# Patient Record
Sex: MALE | Race: BLACK OR AFRICAN AMERICAN | Employment: UNEMPLOYED | URBAN - METROPOLITAN AREA
[De-identification: names, ages, dates, MRNs, and addresses within clinical notes are randomized per-mention and may not be internally consistent; named-entity substitution may affect disease eponyms.]

---

## 2021-03-27 ENCOUNTER — HOSPITAL ENCOUNTER (INPATIENT)
Age: 26
LOS: 1 days | Discharge: HOME | DRG: 683 | End: 2021-03-28
Attending: EMERGENCY MEDICINE | Admitting: INTERNAL MEDICINE

## 2021-03-27 DIAGNOSIS — E86.0 DEHYDRATION: ICD-10-CM

## 2021-03-27 DIAGNOSIS — E87.5 ACUTE HYPERKALEMIA: ICD-10-CM

## 2021-03-27 DIAGNOSIS — M62.82 NON-TRAUMATIC RHABDOMYOLYSIS: ICD-10-CM

## 2021-03-27 DIAGNOSIS — N17.0 ACUTE RENAL FAILURE WITH TUBULAR NECROSIS (HCC): Primary | ICD-10-CM

## 2021-03-27 PROBLEM — S93.409A ANKLE SPRAIN: Status: ACTIVE | Noted: 2021-03-27

## 2021-03-27 PROBLEM — N17.9 AKI (ACUTE KIDNEY INJURY) (HCC): Status: ACTIVE | Noted: 2021-03-27

## 2021-03-27 LAB
ALBUMIN SERPL-MCNC: 6.3 G/DL (ref 3.5–5)
ALBUMIN/GLOB SERPL: 1.5 {RATIO} (ref 1.1–2.2)
ALP SERPL-CCNC: 87 U/L (ref 45–117)
ALT SERPL-CCNC: 39 U/L (ref 12–78)
AMPHET UR QL SCN: NEGATIVE
ANION GAP SERPL CALC-SCNC: 11 MMOL/L (ref 5–15)
ANION GAP SERPL CALC-SCNC: 15 MMOL/L (ref 5–15)
ANION GAP SERPL CALC-SCNC: 17 MMOL/L (ref 5–15)
ANION GAP SERPL CALC-SCNC: 7 MMOL/L (ref 5–15)
APPEARANCE UR: ABNORMAL
AST SERPL-CCNC: 43 U/L (ref 15–37)
BACTERIA URNS QL MICRO: NEGATIVE /HPF
BARBITURATES UR QL SCN: NEGATIVE
BASOPHILS # BLD: 0 K/UL (ref 0–0.1)
BASOPHILS NFR BLD: 0 % (ref 0–1)
BENZODIAZ UR QL: NEGATIVE
BILIRUB SERPL-MCNC: 3.7 MG/DL (ref 0.2–1)
BILIRUB UR QL: NEGATIVE
BUN SERPL-MCNC: 20 MG/DL (ref 6–20)
BUN SERPL-MCNC: 28 MG/DL (ref 6–20)
BUN SERPL-MCNC: 29 MG/DL (ref 6–20)
BUN SERPL-MCNC: 29 MG/DL (ref 6–20)
BUN/CREAT SERPL: 10 (ref 12–20)
BUN/CREAT SERPL: 12 (ref 12–20)
BUN/CREAT SERPL: 15 (ref 12–20)
BUN/CREAT SERPL: 9 (ref 12–20)
CALCIUM SERPL-MCNC: 10.6 MG/DL (ref 8.5–10.1)
CALCIUM SERPL-MCNC: 8.8 MG/DL (ref 8.5–10.1)
CALCIUM SERPL-MCNC: 9.2 MG/DL (ref 8.5–10.1)
CALCIUM SERPL-MCNC: 9.3 MG/DL (ref 8.5–10.1)
CANNABINOIDS UR QL SCN: POSITIVE
CAOX CRY URNS QL MICRO: ABNORMAL
CHLORIDE SERPL-SCNC: 104 MMOL/L (ref 97–108)
CHLORIDE SERPL-SCNC: 104 MMOL/L (ref 97–108)
CHLORIDE SERPL-SCNC: 106 MMOL/L (ref 97–108)
CHLORIDE SERPL-SCNC: 99 MMOL/L (ref 97–108)
CK SERPL-CCNC: 1220 U/L (ref 39–308)
CK SERPL-CCNC: 837 U/L (ref 39–308)
CO2 SERPL-SCNC: 20 MMOL/L (ref 21–32)
CO2 SERPL-SCNC: 21 MMOL/L (ref 21–32)
CO2 SERPL-SCNC: 25 MMOL/L (ref 21–32)
CO2 SERPL-SCNC: 30 MMOL/L (ref 21–32)
COCAINE UR QL SCN: NEGATIVE
COLOR UR: ABNORMAL
CREAT SERPL-MCNC: 1.34 MG/DL (ref 0.7–1.3)
CREAT SERPL-MCNC: 2.26 MG/DL (ref 0.7–1.3)
CREAT SERPL-MCNC: 2.83 MG/DL (ref 0.7–1.3)
CREAT SERPL-MCNC: 3.25 MG/DL (ref 0.7–1.3)
CREAT UR-MCNC: 161 MG/DL
DIFFERENTIAL METHOD BLD: ABNORMAL
DRUG SCRN COMMENT,DRGCM: ABNORMAL
EOSINOPHIL # BLD: 0 K/UL (ref 0–0.4)
EOSINOPHIL NFR BLD: 0 % (ref 0–7)
EPITH CASTS URNS QL MICRO: ABNORMAL /LPF
ERYTHROCYTE [DISTWIDTH] IN BLOOD BY AUTOMATED COUNT: 13.5 % (ref 11.5–14.5)
ERYTHROCYTE [DISTWIDTH] IN BLOOD BY AUTOMATED COUNT: 14.3 % (ref 11.5–14.5)
GLOBULIN SER CALC-MCNC: 4.3 G/DL (ref 2–4)
GLUCOSE BLD STRIP.AUTO-MCNC: 146 MG/DL (ref 65–100)
GLUCOSE BLD STRIP.AUTO-MCNC: 159 MG/DL (ref 65–100)
GLUCOSE BLD STRIP.AUTO-MCNC: 301 MG/DL (ref 65–100)
GLUCOSE BLD STRIP.AUTO-MCNC: 90 MG/DL (ref 65–100)
GLUCOSE SERPL-MCNC: 84 MG/DL (ref 65–100)
GLUCOSE SERPL-MCNC: 85 MG/DL (ref 65–100)
GLUCOSE SERPL-MCNC: 96 MG/DL (ref 65–100)
GLUCOSE SERPL-MCNC: 97 MG/DL (ref 65–100)
GLUCOSE UR STRIP.AUTO-MCNC: 250 MG/DL
HCT VFR BLD AUTO: 35.3 % (ref 36.6–50.3)
HCT VFR BLD AUTO: 39 % (ref 36.6–50.3)
HCT VFR BLD AUTO: 50.5 % (ref 36.6–50.3)
HGB BLD-MCNC: 13.2 G/DL (ref 12.1–17)
HGB BLD-MCNC: 14.4 G/DL (ref 12.1–17)
HGB BLD-MCNC: 18.5 G/DL (ref 12.1–17)
HGB UR QL STRIP: NEGATIVE
IMM GRANULOCYTES # BLD AUTO: 0 K/UL
IMM GRANULOCYTES NFR BLD AUTO: 0 %
KETONES UR QL STRIP.AUTO: 40 MG/DL
LEUKOCYTE ESTERASE UR QL STRIP.AUTO: NEGATIVE
LYMPHOCYTES # BLD: 1 K/UL (ref 0.8–3.5)
LYMPHOCYTES NFR BLD: 4 % (ref 12–49)
MCH RBC QN AUTO: 28.8 PG (ref 26–34)
MCH RBC QN AUTO: 29 PG (ref 26–34)
MCHC RBC AUTO-ENTMCNC: 36.6 G/DL (ref 30–36.5)
MCHC RBC AUTO-ENTMCNC: 36.9 G/DL (ref 30–36.5)
MCV RBC AUTO: 78.5 FL (ref 80–99)
MCV RBC AUTO: 78.7 FL (ref 80–99)
METHADONE UR QL: NEGATIVE
MONOCYTES # BLD: 0.5 K/UL (ref 0–1)
MONOCYTES NFR BLD: 2 % (ref 5–13)
NEUTS BAND NFR BLD MANUAL: 5 % (ref 0–6)
NEUTS SEG # BLD: 22.5 K/UL (ref 1.8–8)
NEUTS SEG NFR BLD: 89 % (ref 32–75)
NITRITE UR QL STRIP.AUTO: NEGATIVE
NRBC # BLD: 0 K/UL (ref 0–0.01)
NRBC # BLD: 0 K/UL (ref 0–0.01)
NRBC BLD-RTO: 0 PER 100 WBC
NRBC BLD-RTO: 0 PER 100 WBC
OPIATES UR QL: NEGATIVE
PCP UR QL: NEGATIVE
PH UR STRIP: 5 [PH] (ref 5–8)
PHOSPHATE SERPL-MCNC: 3.4 MG/DL (ref 2.6–4.7)
PLATELET # BLD AUTO: 173 K/UL (ref 150–400)
PLATELET # BLD AUTO: 236 K/UL (ref 150–400)
PMV BLD AUTO: 12.3 FL (ref 8.9–12.9)
PMV BLD AUTO: 13 FL (ref 8.9–12.9)
POTASSIUM SERPL-SCNC: 3.5 MMOL/L (ref 3.5–5.1)
POTASSIUM SERPL-SCNC: 4.3 MMOL/L (ref 3.5–5.1)
POTASSIUM SERPL-SCNC: 7.5 MMOL/L (ref 3.5–5.1)
POTASSIUM SERPL-SCNC: 8.6 MMOL/L (ref 3.5–5.1)
PROT SERPL-MCNC: 10.6 G/DL (ref 6.4–8.2)
PROT UR STRIP-MCNC: NEGATIVE MG/DL
RBC # BLD AUTO: 4.97 M/UL (ref 4.1–5.7)
RBC # BLD AUTO: 6.42 M/UL (ref 4.1–5.7)
RBC #/AREA URNS HPF: ABNORMAL /HPF (ref 0–5)
RBC MORPH BLD: ABNORMAL
SERVICE CMNT-IMP: ABNORMAL
SERVICE CMNT-IMP: NORMAL
SODIUM SERPL-SCNC: 137 MMOL/L (ref 136–145)
SODIUM SERPL-SCNC: 139 MMOL/L (ref 136–145)
SODIUM SERPL-SCNC: 141 MMOL/L (ref 136–145)
SODIUM SERPL-SCNC: 142 MMOL/L (ref 136–145)
SODIUM UR-SCNC: 84 MMOL/L
SP GR UR REFRACTOMETRY: 1.01 (ref 1–1.03)
UA: UC IF INDICATED,UAUC: ABNORMAL
UROBILINOGEN UR QL STRIP.AUTO: 0.2 EU/DL (ref 0.2–1)
WBC # BLD AUTO: 15.9 K/UL (ref 4.1–11.1)
WBC # BLD AUTO: 24 K/UL (ref 4.1–11.1)
WBC URNS QL MICRO: ABNORMAL /HPF (ref 0–4)

## 2021-03-27 PROCEDURE — 84100 ASSAY OF PHOSPHORUS: CPT

## 2021-03-27 PROCEDURE — 85027 COMPLETE CBC AUTOMATED: CPT

## 2021-03-27 PROCEDURE — 96361 HYDRATE IV INFUSION ADD-ON: CPT

## 2021-03-27 PROCEDURE — 74011636637 HC RX REV CODE- 636/637: Performed by: EMERGENCY MEDICINE

## 2021-03-27 PROCEDURE — 80053 COMPREHEN METABOLIC PANEL: CPT

## 2021-03-27 PROCEDURE — 99285 EMERGENCY DEPT VISIT HI MDM: CPT

## 2021-03-27 PROCEDURE — 65270000032 HC RM SEMIPRIVATE

## 2021-03-27 PROCEDURE — 80048 BASIC METABOLIC PNL TOTAL CA: CPT

## 2021-03-27 PROCEDURE — 82550 ASSAY OF CK (CPK): CPT

## 2021-03-27 PROCEDURE — 96375 TX/PRO/DX INJ NEW DRUG ADDON: CPT

## 2021-03-27 PROCEDURE — 94640 AIRWAY INHALATION TREATMENT: CPT

## 2021-03-27 PROCEDURE — 81001 URINALYSIS AUTO W/SCOPE: CPT

## 2021-03-27 PROCEDURE — 96365 THER/PROPH/DIAG IV INF INIT: CPT

## 2021-03-27 PROCEDURE — 93005 ELECTROCARDIOGRAM TRACING: CPT

## 2021-03-27 PROCEDURE — 82962 GLUCOSE BLOOD TEST: CPT

## 2021-03-27 PROCEDURE — 96374 THER/PROPH/DIAG INJ IV PUSH: CPT

## 2021-03-27 PROCEDURE — 74011250637 HC RX REV CODE- 250/637: Performed by: INTERNAL MEDICINE

## 2021-03-27 PROCEDURE — 36415 COLL VENOUS BLD VENIPUNCTURE: CPT

## 2021-03-27 PROCEDURE — 74011250636 HC RX REV CODE- 250/636: Performed by: EMERGENCY MEDICINE

## 2021-03-27 PROCEDURE — 85025 COMPLETE CBC W/AUTO DIFF WBC: CPT

## 2021-03-27 PROCEDURE — 85018 HEMOGLOBIN: CPT

## 2021-03-27 PROCEDURE — 96366 THER/PROPH/DIAG IV INF ADDON: CPT

## 2021-03-27 PROCEDURE — 74011000250 HC RX REV CODE- 250: Performed by: EMERGENCY MEDICINE

## 2021-03-27 PROCEDURE — 74011250636 HC RX REV CODE- 250/636: Performed by: INTERNAL MEDICINE

## 2021-03-27 PROCEDURE — 84300 ASSAY OF URINE SODIUM: CPT

## 2021-03-27 PROCEDURE — 80307 DRUG TEST PRSMV CHEM ANLYZR: CPT

## 2021-03-27 PROCEDURE — 82570 ASSAY OF URINE CREATININE: CPT

## 2021-03-27 RX ORDER — OXYCODONE HYDROCHLORIDE 5 MG/1
5 TABLET ORAL
Status: DISCONTINUED | OUTPATIENT
Start: 2021-03-27 | End: 2021-03-28 | Stop reason: HOSPADM

## 2021-03-27 RX ORDER — SODIUM CHLORIDE 0.9 % (FLUSH) 0.9 %
5-40 SYRINGE (ML) INJECTION EVERY 8 HOURS
Status: DISCONTINUED | OUTPATIENT
Start: 2021-03-27 | End: 2021-03-27

## 2021-03-27 RX ORDER — ENOXAPARIN SODIUM 100 MG/ML
40 INJECTION SUBCUTANEOUS DAILY
Status: DISCONTINUED | OUTPATIENT
Start: 2021-03-27 | End: 2021-03-27

## 2021-03-27 RX ORDER — FAMOTIDINE 20 MG/1
20 TABLET, FILM COATED ORAL DAILY PRN
Status: DISCONTINUED | OUTPATIENT
Start: 2021-03-27 | End: 2021-03-27

## 2021-03-27 RX ORDER — POLYETHYLENE GLYCOL 3350 17 G/17G
17 POWDER, FOR SOLUTION ORAL DAILY PRN
Status: DISCONTINUED | OUTPATIENT
Start: 2021-03-27 | End: 2021-03-28 | Stop reason: HOSPADM

## 2021-03-27 RX ORDER — ONDANSETRON 2 MG/ML
4 INJECTION INTRAMUSCULAR; INTRAVENOUS
Status: COMPLETED | OUTPATIENT
Start: 2021-03-27 | End: 2021-03-27

## 2021-03-27 RX ORDER — PROMETHAZINE HYDROCHLORIDE 25 MG/1
12.5 TABLET ORAL
Status: DISCONTINUED | OUTPATIENT
Start: 2021-03-27 | End: 2021-03-28 | Stop reason: HOSPADM

## 2021-03-27 RX ORDER — ALBUTEROL SULFATE 2.5 MG/.5ML
10 SOLUTION RESPIRATORY (INHALATION)
Status: COMPLETED | OUTPATIENT
Start: 2021-03-27 | End: 2021-03-27

## 2021-03-27 RX ORDER — DEXTROSE 50 % IN WATER (D50W) INTRAVENOUS SYRINGE
Status: DISCONTINUED
Start: 2021-03-27 | End: 2021-03-27

## 2021-03-27 RX ORDER — CALCIUM GLUCONATE 94 MG/ML
2 INJECTION, SOLUTION INTRAVENOUS
Status: COMPLETED | OUTPATIENT
Start: 2021-03-27 | End: 2021-03-27

## 2021-03-27 RX ORDER — FAMOTIDINE 20 MG/1
20 TABLET, FILM COATED ORAL
Status: DISCONTINUED | OUTPATIENT
Start: 2021-03-27 | End: 2021-03-27

## 2021-03-27 RX ORDER — HEPARIN SODIUM 5000 [USP'U]/ML
5000 INJECTION, SOLUTION INTRAVENOUS; SUBCUTANEOUS EVERY 12 HOURS
Status: DISCONTINUED | OUTPATIENT
Start: 2021-03-27 | End: 2021-03-27

## 2021-03-27 RX ORDER — DEXTROSE 50 % IN WATER (D50W) INTRAVENOUS SYRINGE
50
Status: COMPLETED | OUTPATIENT
Start: 2021-03-27 | End: 2021-03-27

## 2021-03-27 RX ORDER — ACETAMINOPHEN 650 MG/1
650 SUPPOSITORY RECTAL
Status: DISCONTINUED | OUTPATIENT
Start: 2021-03-27 | End: 2021-03-28 | Stop reason: HOSPADM

## 2021-03-27 RX ORDER — SODIUM CHLORIDE 0.9 % (FLUSH) 0.9 %
5-40 SYRINGE (ML) INJECTION AS NEEDED
Status: DISCONTINUED | OUTPATIENT
Start: 2021-03-27 | End: 2021-03-28 | Stop reason: HOSPADM

## 2021-03-27 RX ORDER — SODIUM CHLORIDE 9 MG/ML
125 INJECTION, SOLUTION INTRAVENOUS CONTINUOUS
Status: DISCONTINUED | OUTPATIENT
Start: 2021-03-27 | End: 2021-03-28

## 2021-03-27 RX ORDER — ACETAMINOPHEN 325 MG/1
650 TABLET ORAL
Status: DISCONTINUED | OUTPATIENT
Start: 2021-03-27 | End: 2021-03-28 | Stop reason: HOSPADM

## 2021-03-27 RX ORDER — ONDANSETRON 2 MG/ML
4 INJECTION INTRAMUSCULAR; INTRAVENOUS
Status: DISCONTINUED | OUTPATIENT
Start: 2021-03-27 | End: 2021-03-28 | Stop reason: HOSPADM

## 2021-03-27 RX ORDER — FAMOTIDINE 20 MG/1
20 TABLET, FILM COATED ORAL DAILY PRN
Status: DISCONTINUED | OUTPATIENT
Start: 2021-03-27 | End: 2021-03-28 | Stop reason: HOSPADM

## 2021-03-27 RX ADMIN — Medication 10 ML: at 15:10

## 2021-03-27 RX ADMIN — OXYCODONE HYDROCHLORIDE 5 MG: 5 TABLET ORAL at 17:41

## 2021-03-27 RX ADMIN — OXYCODONE HYDROCHLORIDE 5 MG: 5 TABLET ORAL at 09:54

## 2021-03-27 RX ADMIN — SODIUM CHLORIDE, POTASSIUM CHLORIDE, SODIUM LACTATE AND CALCIUM CHLORIDE 1000 ML: 600; 310; 30; 20 INJECTION, SOLUTION INTRAVENOUS at 02:59

## 2021-03-27 RX ADMIN — CALCIUM GLUCONATE 2 G: 98 INJECTION, SOLUTION INTRAVENOUS at 02:50

## 2021-03-27 RX ADMIN — SODIUM CHLORIDE 1000 ML: 9 INJECTION, SOLUTION INTRAVENOUS at 01:45

## 2021-03-27 RX ADMIN — ALBUTEROL SULFATE 10 MG: 2.5 SOLUTION RESPIRATORY (INHALATION) at 04:44

## 2021-03-27 RX ADMIN — SODIUM CHLORIDE 125 ML/HR: 900 INJECTION, SOLUTION INTRAVENOUS at 23:00

## 2021-03-27 RX ADMIN — SODIUM CHLORIDE, POTASSIUM CHLORIDE, SODIUM LACTATE AND CALCIUM CHLORIDE 1000 ML: 600; 310; 30; 20 INJECTION, SOLUTION INTRAVENOUS at 04:35

## 2021-03-27 RX ADMIN — ONDANSETRON HYDROCHLORIDE 4 MG: 2 SOLUTION INTRAMUSCULAR; INTRAVENOUS at 01:47

## 2021-03-27 RX ADMIN — DEXTROSE MONOHYDRATE 25 G: 25 INJECTION, SOLUTION INTRAVENOUS at 03:02

## 2021-03-27 RX ADMIN — SODIUM BICARBONATE: 84 INJECTION, SOLUTION INTRAVENOUS at 03:10

## 2021-03-27 RX ADMIN — ALBUTEROL SULFATE 10 MG: 2.5 SOLUTION RESPIRATORY (INHALATION) at 02:58

## 2021-03-27 RX ADMIN — HUMAN INSULIN 10 UNITS: 100 INJECTION, SOLUTION SUBCUTANEOUS at 02:56

## 2021-03-27 RX ADMIN — DEXTROSE MONOHYDRATE 25 G: 25 INJECTION, SOLUTION INTRAVENOUS at 04:31

## 2021-03-27 RX ADMIN — HUMAN INSULIN 5 UNITS: 100 INJECTION, SOLUTION SUBCUTANEOUS at 04:32

## 2021-03-27 RX ADMIN — SODIUM CHLORIDE 1000 ML: 9 INJECTION, SOLUTION INTRAVENOUS at 02:43

## 2021-03-27 RX ADMIN — ACETAMINOPHEN 650 MG: 325 TABLET ORAL at 09:54

## 2021-03-27 RX ADMIN — SODIUM CHLORIDE 125 ML/HR: 900 INJECTION, SOLUTION INTRAVENOUS at 15:10

## 2021-03-27 RX ADMIN — ACETAMINOPHEN 650 MG: 325 TABLET ORAL at 17:41

## 2021-03-27 NOTE — PROGRESS NOTES
Hospitalist Progress Note    NAME: Rica Ryan. :  1995   MRN:  [id number]       Assessment / Plan:  Acute kidney injury likely multifactorial possible dehydration I do not think this is rhabdomyolysis with CPK of 800. We will continue current regimen IV fluids normal saline at 125 ml  per hour. Monitor patient closely. Patient renal function is trending down we will continue for another day. Anion gap metabolic acidosis likely secondary to renal failure patient was on bicarb which has been discontinued appeared to be resolved with normal anion gap. Closed. Severe hyper kalemia secondary to acute kidney injury improving and resolved. Continue IV fluids I will DC bicarb. Dehydration continue IV fluids normal saline. Polysubstance abuse with marijuana positive social work consulted counseling. Leukocytosis with WBC of 24,000 no evidence suggestive of infection likely reactive in a young man will monitor repeat WBC count.     / Body mass index is 20.98 kg/m². Code status: Full code  Prophylaxis: Lovenox  Recommended Disposition: Home possible tomorrow morning     Subjective:     Chief Complaint / Reason for Physician Visit  . Discussed with RN events overnight. Patient seen and examined at bedside comfortable no new complaints no chest pain no shortness of breath no fever no chills. Patient stated that he is doing much much better today. Review of Systems:  Symptom Y/N Comments  Symptom Y/N Comments   Fever/Chills x   Chest Pain     Poor Appetite x   Edema     Cough x   Abdominal Pain     Sputum x   Joint Pain     SOB/ROBLES x   Pruritis/Rash     Nausea/vomit x   Tolerating PT/OT     Diarrhea x   Tolerating Diet     Constipation    Other       Could NOT obtain due to:      Objective:     VITALS:   Last 24hrs VS reviewed since prior progress note.  Most recent are:  Patient Vitals for the past 24 hrs:   Temp Pulse Resp BP SpO2   21 0751 98 °F (36.7 °C) 76 11 133/64 100 % 03/27/21 0600 97.9 °F (36.6 °C) 72 10 (!) 128/58 100 %   03/27/21 0507 97.5 °F (36.4 °C) 83 21 (!) 116/57 100 %   03/27/21 0443     100 %   03/27/21 0330  87 11 128/62 100 %   03/27/21 0300  65 18 (!) 125/48 100 %   03/27/21 0257     100 %   03/27/21 0246  65 11 (!) 128/56 100 %   03/27/21 0200   14 117/60 100 %   03/27/21 0138 97.5 °F (36.4 °C) 64 22 96/70 100 %   03/27/21 0135  (!) 59 14 96/70        Intake/Output Summary (Last 24 hours) at 3/27/2021 1011  Last data filed at 3/27/2021 0600  Gross per 24 hour   Intake 4566.67 ml   Output 630 ml   Net 3936.67 ml        I had a face to face encounter and independently examined this patient on 3/27/2021, as outlined below:  PHYSICAL EXAM:  General: WD, WN. Alert, cooperative, no acute distress    EENT:  EOMI. Anicteric sclerae. MMM  Resp:  CTA bilaterally, no wheezing or rales. No accessory muscle use  CV:  Regular  rhythm,  No edema  GI:  Soft, Non distended, Non tender. +Bowel sounds  Neurologic:  Alert and oriented X 3, normal speech,   Psych:   Good insight. Not anxious nor agitated  Skin:  No rashes. No jaundice    Reviewed most current lab test results and cultures  YES  Reviewed most current radiology test results   YES  Review and summation of old records today    NO  Reviewed patient's current orders and MAR    YES  PMH/ reviewed - no change compared to H&P  ________________________________________________________________________  Care Plan discussed with:    Comments   Patient     Family      RN     Care Manager     Consultant                        Multidiciplinary team rounds were held today with , nursing, pharmacist and clinical coordinator. Patient's plan of care was discussed; medications were reviewed and discharge planning was addressed.      ________________________________________________________________________  Total NON critical care TIME    Total CRITICAL CARE TIME Spent:   Minutes non procedure based Comments   >50% of visit spent in counseling and coordination of care     ________________________________________________________________________  Susana Ragsdale MD     Procedures: see electronic medical records for all procedures/Xrays and details which were not copied into this note but were reviewed prior to creation of Plan. LABS:  I reviewed today's most current labs and imaging studies.   Pertinent labs include:  Recent Labs     03/27/21 0139   WBC 24.0*   HGB 18.5*   HCT 50.5*        Recent Labs     03/27/21  0430 03/27/21  0238 03/27/21 0139    139 137   K 4.3 7.5* 8.6*    104 99   CO2 25 20* 21   GLU 96 84 97   BUN 28* 29* 29*   CREA 2.26* 2.83* 3.25*   CA 9.3 8.8 10.6*   PHOS  --  3.4  --    ALB  --   --  6.3*   TBILI  --   --  3.7*   ALT  --   --  39       Signed: Susana Ragsdale MD

## 2021-03-27 NOTE — PROGRESS NOTES
TRANSFER - IN REPORT:    Verbal report received from Benedict RN(name) on [address]  being received from ED(unit) for routine progression of care      Report consisted of patients Situation, Background, Assessment and   Recommendations(SBAR). Information from the following report(s) SBAR, Kardex, ED Summary, Intake/Output, MAR, Recent Results, Med Rec Status and Cardiac Rhythm  was reviewed with the receiving nurse. Opportunity for questions and clarification was provided. Assessment completed upon patients arrival to unit and care assumed.

## 2021-03-27 NOTE — ED NOTES
TRANSFER - OUT REPORT:    Verbal report given to Arnaud Mendoza (name) on Adi Patterson. Anju Patches  being transferred to [address] [place] [address] ([facility]) for routine progression of care       Report consisted of patients Situation, Background, Assessment and   Recommendations(SBAR). Information from the following report(s) SBAR was reviewed with the receiving nurse. Lines:   Peripheral IV 03/27/21 Left Antecubital (Active)   Site Assessment Clean, dry, & intact 03/27/21 0144   Phlebitis Assessment 0 03/27/21 0144   Infiltration Assessment 0 03/27/21 0144   Dressing Status Clean, dry, & intact 03/27/21 0144   Dressing Type Transparent 03/27/21 0144   Hub Color/Line Status Blue 03/27/21 0144   Action Taken Blood drawn 03/27/21 0144       Peripheral IV 03/27/21 Right Arm (Active)   Site Assessment Clean, dry, & intact 03/27/21 0241   Phlebitis Assessment 0 03/27/21 0241   Infiltration Assessment 0 03/27/21 0241   Dressing Status Clean, dry, & intact 03/27/21 0241   Dressing Type Transparent 03/27/21 0241   Hub Color/Line Status Pink 03/27/21 0241   Action Taken Blood drawn 03/27/21 0241        Opportunity for questions and clarification was provided.       Patient transported with:   Monitor  Registered Nurse

## 2021-03-27 NOTE — ED TRIAGE NOTES
18yo M reports to this ED via EMS with c/o excessive thirst, muscle cramps, nausea and one episode of vomiting around 1830 yesterday evening. Pt states he played basketball earlier in the day for about 3hrs without hydrating. When pt returned home he did not feel well so he ate a banana and drank some flavored water but vomited it back up. Since then pt states he has been dry heaving. Reports he has not urinated since 1830. Mucous membranes and skin appear dry on assessment. Pt is alert, oriented, and appropriate at this time. Emergency Department Nursing Plan of Care       The Nursing Plan of Care is developed from the Nursing assessment and Emergency Department Attending provider initial evaluation. The plan of care may be reviewed in the ED Provider note.     The Plan of Care was developed with the following considerations:   Patient / Family readiness to learn indicated by:verbalized understanding  Persons(s) to be included in education: patient  Barriers to Learning/Limitations:No    Signed     Amisha Jimenez RN    3/27/2021   1:58 AM

## 2021-03-27 NOTE — ED NOTES
Spoke to Bettye from [address] [place] [address] about bicarb drip and it not scanning on the [facility]. Adi unable to diagnose why it was not scanning but verbally confirmed over the phone what we had vs what was ordered. Reports everything was correct and reports to bypass alerts.  Primary nurse aware and at bedside to assist.

## 2021-03-27 NOTE — PROGRESS NOTES
[facility] Medication Reconciliation     Information obtained from:Patient  RxQuery data available1:None    Comments/recommendations:    1)Medication reconciliation performed with patient via telephone. Patient denies taking any prescribed or OTC medications. Patient denies any allergies to any medications. 2) Medication changes (since last review): Added  None  Removed  Ibuprofen Suspension  Adjusted  None    3) The [place] Prescription Monitoring Program () was accessed to determine fill history of any controlled medications. No record found. 1RxQuery pharmacy benefit data reflects medications filled and processed through the patient's insurance, however                this data does NOT capture whether the medication was picked up or is currently being taken by the patient.      Thank you,     Etta Garcia, PharmD   Contact: [phone]

## 2021-03-27 NOTE — PROGRESS NOTES
1619  BEDSIDE_VERBAL_ shift change report given to Ramya Rushing (oncoming nurse) by Bennie Jaffe RN (offgoing nurse). Report included the following information; review of SBAR, vs, meds, orders, ROS, POC. All questions answered, transfer of care complete. 1213  Am shift assessment and vs completed. 2280  Hospitalist in at pt bedside for rounding, verbal order to stop IVF, pt likely to stay overnight to review labs, likely discharge in am.  Pt aware and states understanding. 0900  Pt refused heparin injection. 1000  Pt given prn tylenol and roxicodone by МАРИНА Calhoun, RN (nursing supervisor) to treat pain of 4/10. This RN to f/up. 200  Family member at bedside    1210  Noon VS and reassessment completed. No significant changes. RAC IV access removed d/t discomfort per pt request.    1330  Safety rounds completed, pt sleeping, call bell and phone in reach. 1200 Samia Mega Ibarra order for IVF NS at 125ml/hr completed. 1655  Afternoon VS and reassessment completed, no changes to assessment. 1725  Pt level of care down graded to medical, tele monitor removed, pt transferred to room 225.    1750  New IV access started to LEE ANN #22g and serum labs collected at time of IV intiation by МАРИНА Calhoun, RADHA (nursing supervisor). 1800  Safety rounds completed, fall precautions in place, pt in bedside chair watching tv and talking on phone. 1945  Received call from lab requesting a stat repeat H&H for comparison d/t drop in pts hgb from 18.5 collected at 0139 to 14 range collected at 1747. Lab tach informed that pt has been aggressively hydrated during the time frame specifcally r/t admit dx of rhabdo and dehydration. Night shift nurse sitting next to this RN at time of call and notified immediately. 1950  Shift change report given to Bennie Jaffe RN by this RN. Report included review of SBAR, vs, labs, meds, orders, ROS and POC. All questions answered, transfer of care complete.

## 2021-03-27 NOTE — PROGRESS NOTES
[facility] Dosing Services    Physician: Dr. Abby Kimbrough    The following medication: Famotidine was automatically dose-adjusted P&T Committee Protocol, with respect to renal function. Consult provided for this   26 y.o. , male , for the indication of GERD    Pt Weight:   Wt Readings from Last 1 Encounters:   03/27/21 59 kg (130 lb)       Previous Regimen Famotidine 20mg bid   Serum Creatinine Lab Results   Component Value Date/Time    Creatinine 2.26 (H) 03/27/2021 04:30 AM    Creatinine (POC) 1.0 09/21/2013 11:29 PM       Creatinine Clearance Estimated Creatinine Clearance: 42.1 mL/min (A) (based on SCr of 2.26 mg/dL (H)). BUN Lab Results   Component Value Date/Time    BUN 28 (H) 03/27/2021 04:30 AM    BUN (POC) 12 09/21/2013 11:29 PM       Dosage changed to:  Famotidine 20mg every 24 hours as needed      Pharmacy to continue to monitor patient daily. Will make dosage adjustments based upon changing renal function.   Thank you,     Meryl Butler, PharmD   Contact: [phone]

## 2021-03-27 NOTE — PROGRESS NOTES
0600 pt arrived to floor via stretcher and IV pump infusing bicarb at 200cc/hr. Pt alert and oriented. Orders reviewed. IV fluids decreased to 125 cc/hr. ADmission assessment completed and care plan established. Pt oriented to room. Ambulated to bathroom with steady gait. Skin intact with no signs of breakdown. Pt alert and oriented. Pleasant and cooperative. 8805  Report given to [facility] RN using SBAR, [facility] and resutls review.

## 2021-03-27 NOTE — PROGRESS NOTES
Reason for Admission:  Per H/P- Cesilia Quintero. Mavis Melara is a 26 y.o.  male who presents with above. Pt says that he was in his usual state of health until yesterday. Due to the good weather, Pt decided to go play basketball x 4 hours yesterday at [facility], didn't drink any water while he was playing. When came back into the house ~5:30pm, he started to feel poorly. +diffuse severe cramping in legs and arms, back, feet. +recurrent nausea, tried to drink water but was continously vomiting. Denies dizziness. No recent illnesses. No longer nauseated, but was dry heaving at home prior to admission. Patient is here under INPATIENT status. Has   BLUE CROSS/[facility] CROSS [facility] PPO. No notifications needed at this time. RUR Score:    7%                 Plan for utilizing home health:      No plans for [facility] at this time. PCP: First and Last name:  Mango Roberts MD     Name of Practice:    Are you a current patient: Yes/No:    Approximate date of last visit:    Can you participate in a virtual visit with your PCP:                     Current Advanced Directive/Advance Care Plan: Full Code      Healthcare Decision Maker:   Click here to complete [address] including selection of the Healthcare Decision Maker Relationship (ie \"Primary\")                             Transition of Care Plan:         Case opened for assessment and discharge planning.     DANA Dooley/NANETTE  [phone]

## 2021-03-27 NOTE — ED PROVIDER NOTES
EMERGENCY DEPARTMENT HISTORY AND PHYSICAL EXAM      Date: 3/27/2021  Patient Name: Donavon Muller    Please note that this dictation was completed with [employer], the computer voice recognition software. Quite often unanticipated grammatical, syntax, homophones, and other interpretive errors are inadvertently transcribed by the computer software. Please disregard these errors. Please excuse any errors that have escaped final proofreading. History of Presenting Illness     Chief Complaint   Patient presents with    Dehydration       History Provided By: Patient     HPI: Donavon Muller, 26 y.o. male, presenting the emergency department complaining of dehydration, patient states he was playing basketball for approximately 4 hours today and did not drink any water. He states that during basketball play he eventually stopped sweating, he became very fatigued and tired, started having muscle cramps. Tried to drink water at the end and then began vomiting. Reports diffuse muscle cramps, myalgia. Denies any fevers or chills. Denies any chest pain or shortness of breath. No other exacerbating or relieving factors or associated symptoms. PCP: David Cee MD    No current facility-administered medications on file prior to encounter. Current Outpatient Medications on File Prior to Encounter   Medication Sig Dispense Refill    ibuprofen (ADVIL;MOTRIN) 100 mg/5 mL suspension Take 30 mL by mouth three (3) times daily as needed (pain). 1 Bottle 0       Past History     Past Medical History:  History reviewed. No pertinent past medical history. Past Surgical History:  History reviewed. No pertinent surgical history. Family History:  History reviewed. No pertinent family history.     Social History:  Social History     Tobacco Use    Smoking status: Current Every Day Smoker     Packs/day: 0.50    Smokeless tobacco: Never Used   Substance Use Topics    Alcohol use: Yes     Comment: occassionally    Drug use: No       Allergies:  No Known Allergies      Review of Systems   Review of Systems   Constitutional: Negative for chills and fever. HENT: Negative for congestion and sore throat. Eyes: Negative for visual disturbance. Respiratory: Negative for cough and shortness of breath. Cardiovascular: Negative for chest pain and leg swelling. Gastrointestinal: Positive for nausea and vomiting. Negative for abdominal pain, blood in stool and diarrhea. Endocrine: Negative for polyuria. Genitourinary: Negative for dysuria and testicular pain. Musculoskeletal: Positive for myalgias. Negative for arthralgias and joint swelling. Skin: Negative for rash. Allergic/Immunologic: Negative for immunocompromised state. Neurological: Negative for weakness and headaches. Hematological: Does not bruise/bleed easily. Psychiatric/Behavioral: Negative for confusion. Physical Exam   Physical Exam  Vitals signs and nursing note reviewed. Constitutional:       Appearance: He is well-developed. HENT:      Head: Normocephalic and atraumatic. Mouth/Throat:      Mouth: Mucous membranes are dry. Eyes:      General:         Right eye: No discharge. Left eye: No discharge. Conjunctiva/sclera: Conjunctivae normal.      Pupils: Pupils are equal, round, and reactive to light. Neck:      Musculoskeletal: Normal range of motion and neck supple. Trachea: No tracheal deviation. Cardiovascular:      Rate and Rhythm: Normal rate and regular rhythm. Heart sounds: Normal heart sounds. No murmur. Pulmonary:      Effort: Pulmonary effort is normal. No respiratory distress. Breath sounds: Normal breath sounds. No wheezing or rales. Abdominal:      General: Bowel sounds are normal.      Palpations: Abdomen is soft. Tenderness: There is no abdominal tenderness. There is no guarding or rebound. Musculoskeletal: Normal range of motion.          General: No tenderness or deformity. Skin:     General: Skin is warm and dry. Findings: No erythema or rash. Neurological:      Mental Status: He is alert and oriented to person, place, and time. Psychiatric:         Behavior: Behavior normal.         Diagnostic Study Results     Labs -     Recent Results (from the past 12 hour(s))   CBC WITH AUTOMATED DIFF    Collection Time: 03/27/21  1:39 AM   Result Value Ref Range    WBC 24.0 (HH) 4.1 - 11.1 K/uL    RBC 6.42 (H) 4.10 - 5.70 M/uL    HGB 18.5 (H) 12.1 - 17.0 g/dL    HCT 50.5 (H) 36.6 - 50.3 %    MCV 78.7 (L) 80.0 - 99.0 FL    MCH 28.8 26.0 - 34.0 PG    MCHC 36.6 (H) 30.0 - 36.5 g/dL    RDW 14.3 11.5 - 14.5 %    PLATELET 788 841 - 316 K/uL    MPV 12.3 8.9 - 12.9 FL    NRBC 0.0 0  WBC    ABSOLUTE NRBC 0.00 0.00 - 0.01 K/uL    NEUTROPHILS 89 (H) 32 - 75 %    BAND NEUTROPHILS 5 0 - 6 %    LYMPHOCYTES 4 (L) 12 - 49 %    MONOCYTES 2 (L) 5 - 13 %    EOSINOPHILS 0 0 - 7 %    BASOPHILS 0 0 - 1 %    IMMATURE GRANULOCYTES 0 %    ABS. NEUTROPHILS 22.5 (H) 1.8 - 8.0 K/UL    ABS. LYMPHOCYTES 1.0 0.8 - 3.5 K/UL    ABS. MONOCYTES 0.5 0.0 - 1.0 K/UL    ABS. EOSINOPHILS 0.0 0.0 - 0.4 K/UL    ABS. BASOPHILS 0.0 0.0 - 0.1 K/UL    ABS. IMM. GRANS. 0.0 K/UL    DF MANUAL      RBC COMMENTS STOMATOCYTES  1+       METABOLIC PANEL, COMPREHENSIVE    Collection Time: 03/27/21  1:39 AM   Result Value Ref Range    Sodium 137 136 - 145 mmol/L    Potassium 8.6 (HH) 3.5 - 5.1 mmol/L    Chloride 99 97 - 108 mmol/L    CO2 21 21 - 32 mmol/L    Anion gap 17 (H) 5 - 15 mmol/L    Glucose 97 65 - 100 mg/dL    BUN 29 (H) 6 - 20 MG/DL    Creatinine 3.25 (H) 0.70 - 1.30 MG/DL    BUN/Creatinine ratio 9 (L) 12 - 20      GFR est AA 29 (L) >60 ml/min/1.73m2    GFR est non-AA 24 (L) >60 ml/min/1.73m2    Calcium 10.6 (H) 8.5 - 10.1 MG/DL    Bilirubin, total 3.7 (H) 0.2 - 1.0 MG/DL    ALT (SGPT) 39 12 - 78 U/L    AST (SGOT) 43 (H) 15 - 37 U/L    Alk.  phosphatase 87 45 - 117 U/L    Protein, total 10.6 (H) 6.4 - 8.2 g/dL    Albumin 6.3 (H) 3.5 - 5.0 g/dL    Globulin 4.3 (H) 2.0 - 4.0 g/dL    A-G Ratio 1.5 1.1 - 2.2     CK    Collection Time: 03/27/21  1:39 AM   Result Value Ref Range     (H) 39 - 308 U/L   EKG, 12 LEAD, INITIAL    Collection Time: 03/27/21  2:28 AM   Result Value Ref Range    Ventricular Rate 58 BPM    Atrial Rate 58 BPM    P-R Interval 152 ms    QRS Duration 80 ms    Q-T Interval 424 ms    QTC Calculation (Bezet) 416 ms    Calculated P Axis 82 degrees    Calculated R Axis 79 degrees    Calculated T Axis 75 degrees    Diagnosis       Sinus bradycardia with sinus arrhythmia  Otherwise normal ECG  When compared with ECG of 21-SEP-2013 23:20,  ST elevation now present in Anterior leads  Nonspecific T wave abnormality no longer evident in Inferior leads  Nonspecific T wave abnormality no longer evident in Anterior leads     METABOLIC PANEL, BASIC    Collection Time: 03/27/21  2:38 AM   Result Value Ref Range    Sodium 139 136 - 145 mmol/L    Potassium 7.5 (HH) 3.5 - 5.1 mmol/L    Chloride 104 97 - 108 mmol/L    CO2 20 (L) 21 - 32 mmol/L    Anion gap 15 5 - 15 mmol/L    Glucose 84 65 - 100 mg/dL    BUN 29 (H) 6 - 20 MG/DL    Creatinine 2.83 (H) 0.70 - 1.30 MG/DL    BUN/Creatinine ratio 10 (L) 12 - 20      GFR est AA 34 (L) >60 ml/min/1.73m2    GFR est non-AA 28 (L) >60 ml/min/1.73m2    Calcium 8.8 8.5 - 10.1 MG/DL   PHOSPHORUS    Collection Time: 03/27/21  2:38 AM   Result Value Ref Range    Phosphorus 3.4 2.6 - 4.7 MG/DL       Radiologic Studies -   No orders to display     CT Results  (Last 48 hours)    None        CXR Results  (Last 48 hours)    None            Medical Decision Making   I am the first provider for this patient. I reviewed the vital signs, available nursing notes, past medical history, past surgical history, family history and social history. Vital Signs-Reviewed the patient's vital signs.   Patient Vitals for the past 12 hrs:   Temp Pulse Resp BP SpO2   03/27/21 0257     100 %   03/27/21 0138 97.5 °F (36.4 °C) 64 22 96/70 100 %       EKG interpretation:  Sinus bradycardic rhythm. Rate 58. Normal axis. Normal intervals. There is peak T waves in the precordium concerning for hyperkalemia. Interpreted by me    Records Reviewed:   Nursing notes, Prior visits     Provider Notes (Medical Decision Making):   Patient presenting with concern for dehydration, possible rhabdo, will check labs including CK, will provide IV fluids for dehydration. Patient looks nontoxic, but mildly ill. Disposition pending laboratory work-up, symptomatic improvement. ED Course:   Initial assessment performed. The patients presenting problems have been discussed, and they are in agreement with the care plan formulated and outlined with them. I have encouraged them to ask questions as they arise throughout their visit. ED Course as of Mar 27 0517   Sat Mar 27, 2021   0320 [facility] hospitalist has been paged. Awaiting return call    [AR]   6370 Discussed with hospitalist at [place]. They feel the patient needs to go through the ED and may need urgent dialysis. I agree with the patient needing possible urgent dialysis. He is hemodynamically stable at this point though, mentating normally. I am rechecking a Chem-7 at this point and repeating some albuterol insulin and dextrose. He is still on a bicarb drip. He is making some urine, but not 200 mL of urine. The transfer center was unable to reach an ED physician as they were in the trauma bay at this point. They will call back when a physician is available    [AR]   [phone] ED will conditionally accept, but want me to talk to our hospitalist first to see if they will keep the patient here before accepting the patient, as they do not believe he needs dialysis emergently. I relayed that they would likely not keep the patient here given his acuity and risk of decompensation, and the patient would be best served by a higher level of care.  Will consult our tele-hospitalist to see if they will accept. Discussed with Dr. Schwab, ed attending    [AR]   0454 Potassium now 4.3. Could possibly be admitted here given rapid improvement.     [AR]   0501 Discussed with Emily Calderon MD. Will admit here.     [AR]      ED Course User Index  [AR] Abhijit Gr,        Lab interpretation/plan:  Patient found to have significant hyperkalemia, EKG does look concerning for hyperkalemia with peaked T waves in the precordium.  Patient is already had a liter of normal saline.  Will order another liter of normal saline, place another IV, give IV calcium, IV insulin, IV dextrose, bicarb drip, high-dose albuterol.  Patient will need transfer to higher level of care.  Patient and family would prefer to go to [facility]    3:11 AM  Repeat potassium still elevated at 7.5.  Improving      Critical Care Time:   I have spent 96 minutes of critical care time in evaluating and treating this patient.  This includes time spent at bedside, time with family and decision makers, documentation, review of labs and imaging, and/or consultation with specialists.  It does not include time spent on separately billed procedures.     This patient presents with a critical illness or injury that acutely impairs one or more vital organ systems such that there is a high probability of imminent or life threatening deterioration in the patient's condition.  This case involved decision making of high complexity to assess, manipulate, and support vital organ system failure and/or to prevent further life threatening deterioration of the patient's condition. Failure to initiate these interventions on an urgent basis would likely result in sudden, clinically significant or life threatening deterioration in the patient's condition.    Abnormal findings supporting critical care: Hyperkalemia, acute renal failure, dehydration  Interventions to support critical care:  IV fluids times multiple boluses, IV insulin, IV dextrose, IV bicarb  drip, lab reassessment, lab interpretation, frequent reassessment, EKG interpretation, cardiac monitoring  Failure to intervene may result in: Arrhythmia, renal failure, need for dialysis, death    Disposition:  Patient is being admitted to the hospital by Dr. Ivan Faye. The results of their tests and reasons for their admission have been discussed with them and/or available family. They convey agreement and understanding for the need to be admitted and for their admission diagnosis. Consultation has been made with the inpatient physician specialist for hospitalization. PLAN:  1. Current Discharge Medication List        2. Follow-up Information    None         Return to ED if worse     Diagnosis     Clinical Impression:   1. Acute renal failure with tubular necrosis (HCC)    2. Acute hyperkalemia    3. Dehydration    4. Non-traumatic rhabdomyolysis        Attestations:   This note was completed by Brad Woo DO

## 2021-03-27 NOTE — PROGRESS NOTES
Care plan reviewed, pt progressing appropriately    Problem: Falls - Risk of  Goal: *Absence of Falls  Description: Document Rama Hines Fall Risk and appropriate interventions in the flowsheet.   Outcome: Progressing Towards Goal  Note: Fall Risk Interventions:   Bed low and locked  Call bell and phone in reach  Nonskid footwear  Side rails up x2         Medication Interventions: Evaluate medications/consider consulting pharmacy, Teach patient to arise slowly                   Problem: Patient Education: Go to Patient Education Activity  Goal: Patient/Family Education  Outcome: Progressing Towards Goal     Problem: Pain  Goal: *Control of Pain  Outcome: Progressing Towards Goal  Goal: *PALLIATIVE CARE:  Alleviation of Pain  Outcome: Progressing Towards Goal     Problem: Patient Education: Go to Patient Education Activity  Goal: Patient/Family Education  Outcome: Progressing Towards Goal

## 2021-03-27 NOTE — H&P
Hospitalist Admission Note    NAME: Amber Roman   :  1995   MRN:  [id number]     Date/Time:  3/27/2021 5:04 AM    Patient PCP: Librado Mcmahan MD  ______________________________________________________________________  Given the patient's current clinical presentation, I have a high level of concern for decompensation if discharged from the emergency department. Complex decision making was performed, which includes reviewing the patient's available past medical records, laboratory results, and x-ray films. My assessment of this patient's clinical condition and my plan of care is as follows. 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 Will need a work note if still hospitalized on . Subjective:   CHIEF COMPLAINT:  Nausea, cramping    HISTORY OF PRESENT ILLNESS:     Amberorion Villafuerteofe Roman is a 26 y.o.  male who presents with above. Pt says that he was in his usual state of health until yesterday. Due to the good weather, Pt decided to go play basketball x 4 hours yesterday at [facility], didn't drink any water while he was playing. When came back into the house ~5:30pm, he started to feel poorly. +diffuse severe cramping in legs and arms, back, feet. +recurrent nausea, tried to drink water but was continously vomiting. Denies dizziness. No recent illnesses. No longer nauseated, but was dry heaving at home prior to admission. We were asked to admit for work up and evaluation of the above problems. History reviewed. Remote ankle sprain, no chronic medical illnesses. History reviewed. No pertinent surgical history. Social History     Tobacco Use    Smoking status: Current Every Day Smoker     Packs/day: 0.50    Smokeless tobacco: Never Used   Substance Use Topics    Alcohol use: Yes     Comment: occassionally        Family History   Problem Relation Age of Onset    Diabetes Father        No Known Allergies     Prior to Admission medications    Medication Sig Start Date End Date Taking? Authorizing Provider   ibuprofen (ADVIL;MOTRIN) 100 mg/5 mL suspension Take 30 mL by mouth three (3) times daily as needed (pain). 5/6/15   Margueritte Schilder, PA       REVIEW OF SYSTEMS:     I am not able to complete the review of systems because:    The patient is intubated and sedated    The patient has altered mental status due to his acute medical problems    The patient has baseline aphasia from prior stroke(s)    The patient has baseline dementia and is not reliable historian    The patient is in acute medical distress and unable to provide information           Total of 12 systems reviewed as follows:       POSITIVE= underlined text  Negative = text not underlined  General:  fever, chills, sweats, generalized weakness, weight loss/gain,      loss of appetite   Eyes:    blurred vision, eye pain, loss of vision, double vision  ENT:    rhinorrhea, pharyngitis   Respiratory:   cough, sputum production, SOB, ROBLES, wheezing, pleuritic pain   Cardiology:   chest pain, palpitations, orthopnea, PND, edema, syncope   Gastrointestinal:  abdominal pain , N/V, diarrhea, dysphagia, constipation, bleeding   Genitourinary:  frequency, urgency, dysuria, hematuria, incontinence   Muskuloskeletal :  arthralgia, myalgia, back pain  Hematology:  easy bruising, nose or gum bleeding, lymphadenopathy   Dermatological: rash, ulceration, pruritis, color change / jaundice  Endocrine:   hot flashes or polydipsia   Neurological:  headache, dizziness, confusion, focal weakness, paresthesia,     Speech difficulties, memory loss, gait difficulty  Psychological: Feelings of anxiety, depression, agitation    Objective:   VITALS:    Visit Vitals  /62 (BP 1 Location: Left arm, BP Patient Position: At rest)   Pulse 87   Temp 97.5 °F (36.4 °C)   Resp 11   Ht 5' 6\" (1.676 m)   Wt 59 kg (130 lb)   SpO2 100%   BMI 20.98 kg/m²       PHYSICAL EXAM:  General:    Alert, cooperative, no distress, appears stated age. HEENT: Atraumatic  Neck:  Symmetrical  Lungs:   Stethoscope not working on machine, unable to examine lungs. Nonlabored breathing observed. Chest wall:  No Accessory muscle use. Heart:   Stethoscope not working on machine, unable to examine heart. No edema  Abdomen:   Mildly distended. Extremities: No cyanosis  Skin:     Not pale. Not Jaundiced. No rashes apparent. Psych:  Fair insight. Not depressed. Not anxious or agitated. Neurologic: EOMs intact. No facial asymmetry. No aphasia or slurred speech.  Alert and oriented X 4.      _______________________________________________________________________  Care Plan discussed with:    Comments   Patient x    Family      RN x    Care Manager                    Consultant:      _______________________________________________________________________  Expected  Disposition:   Home with Family x   HH/PT/OT/RN    SNF/LTC    ARAMIS    ________________________________________________________________________  TOTAL TIME:  39 Minutes    Critical Care Provided     Minutes non procedure based      Comments    x Reviewed previous records   >50% of visit spent in counseling and coordination of care x Discussion with patient and/or family and questions answered       ________________________________________________________________________  Signed: Maria Elena Bonner MD    Procedures: see electronic medical records for all procedures/Xrays and details which were not copied into this note but were reviewed prior to creation of Plan. LAB DATA REVIEWED:    Recent Results (from the past 24 hour(s))   CBC WITH AUTOMATED DIFF    Collection Time: 03/27/21  1:39 AM   Result Value Ref Range    WBC 24.0 (HH) 4.1 - 11.1 K/uL    RBC 6.42 (H) 4.10 - 5.70 M/uL    HGB 18.5 (H) 12.1 - 17.0 g/dL    HCT 50.5 (H) 36.6 - 50.3 %    MCV 78.7 (L) 80.0 - 99.0 FL    MCH 28.8 26.0 - 34.0 PG    MCHC 36.6 (H) 30.0 - 36.5 g/dL    RDW 14.3 11.5 - 14.5 %    PLATELET 524 689 - 847 K/uL    MPV 12.3 8.9 - 12.9 FL    NRBC 0.0 0  WBC    ABSOLUTE NRBC 0.00 0.00 - 0.01 K/uL    NEUTROPHILS 89 (H) 32 - 75 %    BAND NEUTROPHILS 5 0 - 6 %    LYMPHOCYTES 4 (L) 12 - 49 %    MONOCYTES 2 (L) 5 - 13 %    EOSINOPHILS 0 0 - 7 %    BASOPHILS 0 0 - 1 %    IMMATURE GRANULOCYTES 0 %    ABS. NEUTROPHILS 22.5 (H) 1.8 - 8.0 K/UL    ABS. LYMPHOCYTES 1.0 0.8 - 3.5 K/UL    ABS. MONOCYTES 0.5 0.0 - 1.0 K/UL    ABS. EOSINOPHILS 0.0 0.0 - 0.4 K/UL    ABS. BASOPHILS 0.0 0.0 - 0.1 K/UL    ABS. IMM. GRANS. 0.0 K/UL    DF MANUAL      RBC COMMENTS STOMATOCYTES  1+       METABOLIC PANEL, COMPREHENSIVE    Collection Time: 03/27/21  1:39 AM   Result Value Ref Range    Sodium 137 136 - 145 mmol/L    Potassium 8.6 (HH) 3.5 - 5.1 mmol/L    Chloride 99 97 - 108 mmol/L    CO2 21 21 - 32 mmol/L    Anion gap 17 (H) 5 - 15 mmol/L    Glucose 97 65 - 100 mg/dL    BUN 29 (H) 6 - 20 MG/DL    Creatinine 3.25 (H) 0.70 - 1.30 MG/DL    BUN/Creatinine ratio 9 (L) 12 - 20      GFR est AA 29 (L) >60 ml/min/1.73m2    GFR est non-AA 24 (L) >60 ml/min/1.73m2    Calcium 10.6 (H) 8.5 - 10.1 MG/DL    Bilirubin, total 3.7 (H) 0.2 - 1.0 MG/DL    ALT (SGPT) 39 12 - 78 U/L    AST (SGOT) 43 (H) 15 - 37 U/L    Alk.  phosphatase 87 45 - 117 U/L    Protein, total 10.6 (H) 6.4 - 8.2 g/dL    Albumin 6.3 (H) 3.5 - 5.0 g/dL    Globulin 4.3 (H) 2.0 - 4.0 g/dL    A-G Ratio 1.5 1.1 - 2.2     CK    Collection Time: 03/27/21  1:39 AM Result Value Ref Range     (H) 39 - 308 U/L   EKG, 12 LEAD, INITIAL    Collection Time: 03/27/21  2:28 AM   Result Value Ref Range    Ventricular Rate 58 BPM    Atrial Rate 58 BPM    P-R Interval 152 ms    QRS Duration 80 ms    Q-T Interval 424 ms    QTC Calculation (Bezet) 416 ms    Calculated P Axis 82 degrees    Calculated R Axis 79 degrees    Calculated T Axis 75 degrees    Diagnosis       Sinus bradycardia with sinus arrhythmia  Otherwise normal ECG  When compared with ECG of 21-SEP-2013 23:20,  ST elevation now present in Anterior leads  Nonspecific T wave abnormality no longer evident in Inferior leads  Nonspecific T wave abnormality no longer evident in Anterior leads     METABOLIC PANEL, BASIC    Collection Time: 03/27/21  2:38 AM   Result Value Ref Range    Sodium 139 136 - 145 mmol/L    Potassium 7.5 (HH) 3.5 - 5.1 mmol/L    Chloride 104 97 - 108 mmol/L    CO2 20 (L) 21 - 32 mmol/L    Anion gap 15 5 - 15 mmol/L    Glucose 84 65 - 100 mg/dL    BUN 29 (H) 6 - 20 MG/DL    Creatinine 2.83 (H) 0.70 - 1.30 MG/DL    BUN/Creatinine ratio 10 (L) 12 - 20      GFR est AA 34 (L) >60 ml/min/1.73m2    GFR est non-AA 28 (L) >60 ml/min/1.73m2    Calcium 8.8 8.5 - 10.1 MG/DL   PHOSPHORUS    Collection Time: 03/27/21  2:38 AM   Result Value Ref Range    Phosphorus 3.4 2.6 - 4.7 MG/DL   GLUCOSE, POC    Collection Time: 03/27/21  3:18 AM   Result Value Ref Range    Glucose (POC) 159 (H) 65 - 100 mg/dL    Performed by Frank Grullon RN    URINALYSIS W/ REFLEX CULTURE    Collection Time: 03/27/21  3:55 AM    Specimen: Urine   Result Value Ref Range    Color YELLOW/STRAW      Appearance CLOUDY (A) CLEAR      Specific gravity 1.015 1.003 - 1.030      pH (UA) 5.0 5.0 - 8.0      Protein Negative NEG mg/dL    Glucose 250 (A) NEG mg/dL    Ketone 40 (A) NEG mg/dL    Bilirubin Negative NEG      Blood Negative NEG      Urobilinogen 0.2 0.2 - 1.0 EU/dL    Nitrites Negative NEG      Leukocyte Esterase Negative NEG      WBC 0-4 0 - 4 /hpf    RBC 0-5 0 - 5 /hpf    Epithelial cells FEW FEW /lpf    Bacteria Negative NEG /hpf    UA:UC IF INDICATED CULTURE NOT INDICATED BY UA RESULT CNI      CA Oxalate crystals 2+ (A) NEG   DRUG SCREEN, URINE    Collection Time: 03/27/21  3:55 AM   Result Value Ref Range    AMPHETAMINES Negative NEG      BARBITURATES Negative NEG      BENZODIAZEPINES Negative NEG      COCAINE Negative NEG      METHADONE Negative NEG      OPIATES Negative NEG      PCP(PHENCYCLIDINE) Negative NEG      THC (TH-CANNABINOL) Positive (A) NEG      Drug screen comment (NOTE)    GLUCOSE, POC    Collection Time: 03/27/21  3:59 AM   Result Value Ref Range    Glucose (POC) 90 65 - 100 mg/dL    Performed by Lorraine Hanson RN    METABOLIC PANEL, BASIC    Collection Time: 03/27/21  4:30 AM   Result Value Ref Range    Sodium 142 136 - 145 mmol/L    Potassium 4.3 3.5 - 5.1 mmol/L    Chloride 106 97 - 108 mmol/L    CO2 25 21 - 32 mmol/L    Anion gap 11 5 - 15 mmol/L    Glucose 96 65 - 100 mg/dL    BUN 28 (H) 6 - 20 MG/DL    Creatinine 2.26 (H) 0.70 - 1.30 MG/DL    BUN/Creatinine ratio 12 12 - 20      GFR est AA 43 (L) >60 ml/min/1.73m2    GFR est non-AA 36 (L) >60 ml/min/1.73m2    Calcium 9.3 8.5 - 10.1 MG/DL   GLUCOSE, POC    Collection Time: 03/27/21  4:39 AM   Result Value Ref Range    Glucose (POC) 301 (H) 65 - 100 mg/dL    Performed by Lorraine Hanson RN

## 2021-03-28 VITALS
DIASTOLIC BLOOD PRESSURE: 74 MMHG | HEART RATE: 60 BPM | HEIGHT: 66 IN | WEIGHT: 130 LBS | OXYGEN SATURATION: 100 % | SYSTOLIC BLOOD PRESSURE: 129 MMHG | RESPIRATION RATE: 16 BRPM | TEMPERATURE: 98.4 F | BODY MASS INDEX: 20.89 KG/M2

## 2021-03-28 LAB
ANION GAP SERPL CALC-SCNC: 8 MMOL/L (ref 5–15)
BUN SERPL-MCNC: 16 MG/DL (ref 6–20)
BUN/CREAT SERPL: 16 (ref 12–20)
CALCIUM SERPL-MCNC: 8.2 MG/DL (ref 8.5–10.1)
CHLORIDE SERPL-SCNC: 108 MMOL/L (ref 97–108)
CK SERPL-CCNC: 828 U/L (ref 39–308)
CO2 SERPL-SCNC: 28 MMOL/L (ref 21–32)
CREAT SERPL-MCNC: 1.01 MG/DL (ref 0.7–1.3)
ERYTHROCYTE [DISTWIDTH] IN BLOOD BY AUTOMATED COUNT: 13.8 % (ref 11.5–14.5)
GLUCOSE SERPL-MCNC: 90 MG/DL (ref 65–100)
HCT VFR BLD AUTO: 35.8 % (ref 36.6–50.3)
HGB BLD-MCNC: 13.3 G/DL (ref 12.1–17)
MCH RBC QN AUTO: 29.2 PG (ref 26–34)
MCHC RBC AUTO-ENTMCNC: 37.2 G/DL (ref 30–36.5)
MCV RBC AUTO: 78.7 FL (ref 80–99)
NRBC # BLD: 0 K/UL (ref 0–0.01)
NRBC BLD-RTO: 0 PER 100 WBC
PLATELET # BLD AUTO: 139 K/UL (ref 150–400)
PMV BLD AUTO: 12.5 FL (ref 8.9–12.9)
POTASSIUM SERPL-SCNC: 4.3 MMOL/L (ref 3.5–5.1)
RBC # BLD AUTO: 4.55 M/UL (ref 4.1–5.7)
SODIUM SERPL-SCNC: 144 MMOL/L (ref 136–145)
WBC # BLD AUTO: 9.7 K/UL (ref 4.1–11.1)

## 2021-03-28 PROCEDURE — 85027 COMPLETE CBC AUTOMATED: CPT

## 2021-03-28 PROCEDURE — 36415 COLL VENOUS BLD VENIPUNCTURE: CPT

## 2021-03-28 PROCEDURE — 82550 ASSAY OF CK (CPK): CPT

## 2021-03-28 PROCEDURE — 80048 BASIC METABOLIC PNL TOTAL CA: CPT

## 2021-03-28 PROCEDURE — 74011250637 HC RX REV CODE- 250/637: Performed by: INTERNAL MEDICINE

## 2021-03-28 RX ADMIN — OXYCODONE HYDROCHLORIDE 5 MG: 5 TABLET ORAL at 08:48

## 2021-03-28 RX ADMIN — ACETAMINOPHEN 650 MG: 325 TABLET ORAL at 08:48

## 2021-03-28 NOTE — PROGRESS NOTES
1920 bedside shift report using SBAR  2015 blood drawn per lab request  2132 pt sleeping quietly at this time. 2232  Pt continues to rest without signs of distress. Pt slept through the night. Vitals signs stable, iv sites without complication. Report given to [facility] using SBAR<MAR and results.

## 2021-03-28 NOTE — PROGRESS NOTES
Discharge criteria met, care plan reviewed, all goals resolved. Problem: Falls - Risk of  Goal: *Absence of Falls  Description: Document Angelito Curran Fall Risk and appropriate interventions in the flowsheet.   Outcome: Progressing Towards Goal  Note: Fall Risk Interventions:            Medication Interventions: Evaluate medications/consider consulting pharmacy, Teach patient to arise slowly                   Problem: Patient Education: Go to Patient Education Activity  Goal: Patient/Family Education  Outcome: Progressing Towards Goal     Problem: Pain  Goal: *Control of Pain  Outcome: Progressing Towards Goal  Goal: *PALLIATIVE CARE:  Alleviation of Pain  Outcome: Progressing Towards Goal     Problem: Patient Education: Go to Patient Education Activity  Goal: Patient/Family Education  Outcome: Progressing Towards Goal

## 2021-03-28 NOTE — PROGRESS NOTES
0148  BEDSIDE_VERBAL shift change report given to Gautam Muse, RADHA and [facility], RN (orientee) (oncoming nurses) by Orin Boyd RN (offgoing nurse). Report included the following information; review of SBAR, VS, labs, meds, orders, ROS, POC. All questions answered, transfer of care complete. 8753  Am shift assessment and vs complete. MD in at pt bedside during assessment, discharge orders confirmed. 0845  IV access removed. Expectations for discharge process explained to pt, understanding stated. Pt set up to take shower independently. Call made to family member by pt to arrange  after discharge. 0848  Pt medicated for pain of 4/10 by nursing supervisor Enid Juarez RN). 0920  Pt pain reassessed by this RN and Kera, Rn, pt reports 0/10 pain. Pt sitting in bed quietly awaiting discharge paperwork. 1015  AVS completed, discharge instructions reviewed with pt, pt verbalized understanding and able to provide accurate teach back. Pt Awaiting arrival of his mother to bring street clothes, pt instructed to call for RN once ride arrives and pt to be escorted off unit to finalized discharge. 36  Pt still awaiting arrival of mom to unit. Safety rounds complete. Pt denies any needs. 1230  Pt discharged off unit, escorted to ER entrance by МАРИНА Edmonds RN. No s/sx of distress. Discharge complete.

## 2021-03-28 NOTE — PROGRESS NOTES
[address] March 28, 2021       RE: Janey Zarco. To Whom It May Concern,    This is to certify that Janey Zarco. may may return to work on 3/30/21. Please feel free to contact my office if you have any questions or concerns. Thank you for your assistance in this matter.       Sincerely,      Joe Trujillo RN   [address] [place] [address] Nursing Supervisor

## 2021-03-28 NOTE — DISCHARGE SUMMARY
Hospitalist Discharge Summary     Patient ID:  Jo Ann Link  [id number]  38 y.o.  1995  3/27/2021    PCP on record: Librado Mcmahan MD    Admit date: 3/27/2021  Discharge date and time: 3/28/2021    DISCHARGE DIAGNOSIS:  Acute kidney injury POA secondary to dehydration. Acute severe hyperkalemia POA secondary to acute kidney injury resolved  Rhabdomyolysis POA  Intractable nausea and vomiting POA resolved  Leukocytosis POA likely reactive resolved  Marijuana use disorder  Anion gap metabolic acidosis secondary to CLAUDETTE resolved  CONSULTATIONS:  None    Excerpted HPI from H&P of Lino Palmer MD:  Amber Diego. Vesna Roman is a 26 y.o.  male who presents with above. Pt says that he was in his usual state of health until yesterday. Due to the good weather, Pt decided to go play basketball x 4 hours yesterday at [facility], didn't drink any water while he was playing. When came back into the house ~5:30pm, he started to feel poorly. +diffuse severe cramping in legs and arms, back, feet. +recurrent nausea, tried to drink water but was continously vomiting. Denies dizziness. No recent illnesses. No longer nauseated, but was dry heaving at home prior to admission. ______________________________________________________________________  DISCHARGE SUMMARY/HOSPITAL COURSE:  for full details see H&P, daily progress notes, labs, consult notes. Patient is a 58-year-old who initially presented to the emergency room with intractable nausea and vomiting dehydration elevated CPK levels possible rhabdomyolysis playing basketball. Patient was having vomiting extraneous exercise patient was brought into the emergency room was noted to be in CLAUDETTE severe hyperkalemia dehydration acidosis. Patient was admitted to stepdown started on IV fluids also IV bicarb because of severe acidosis rhabdomyolysis.   Patient CPK was about 800+ improved significantly nausea vomiting resolved patient was started on oral intake which patient tolerated very well no nausea no vomiting no abdominal pain renal function improved to baseline. Patient also does smoke a marijuana was counseled and also cigarette smoker was also counseled offered a patch but he declined. Based on patient clinical stability patient is being discharged home will follow up with primary care provider. Patient was not on any medications and no other medications actually prescribed he received IV fluid and bicarb which was subsequently discontinued. Plan of care discussed with patient answered all his questions appropriately patient agreed to the discharge planning. However if symptom recurs patient is advised to come back to the emergency room. _______________________________________________________________________  Patient seen and examined by me on discharge day. Pertinent Findings:  Gen:    Not in distress  Chest: Clear lungs  CVS:   Regular rhythm. No edema  Abd:  Soft, not distended, not tender  Neuro:  Alert,   _______________________________________________________________________  DISCHARGE MEDICATIONS:   Current Discharge Medication List            Patient Follow Up Instructions:    Activity: as tolerated   Diet: regular       Follow-up with PCP      Follow-up Information     Follow up With Specialties Details Why Contact Info    Zahira Gamez MD Adolescent Medicine   [address] [place] [address]  [phone]          ________________________________________________________________    Risk of deterioration: low    Condition at Discharge:  Stable  __________________________________________________________________    Disposition  home    ____________________________________________________________________    Code Status: full   ___________________________________________________________________      Total time in minutes spent coordinating this discharge (includes going over instructions, follow-up, prescriptions, and preparing report for sign off to her PCP) : 38 minutes    Signed:  Beryle Ishikawa, MD

## 2021-03-28 NOTE — DISCHARGE INSTRUCTIONS
Patient Education        Dehydration: Care Instructions  Your Care Instructions  Dehydration happens when your body loses too much fluid. This might happen when you do not drink enough water or you lose large amounts of fluids from your body because of diarrhea, vomiting, or sweating. Severe dehydration can be life-threatening. Water and minerals called electrolytes help put your body fluids back in balance. Learn the early signs of fluid loss, and drink more fluids to prevent dehydration. Follow-up care is a key part of your treatment and safety. Be sure to make and go to all appointments, and call your doctor if you are having problems. It's also a good idea to know your test results and keep a list of the medicines you take. How can you care for yourself at home? · To prevent dehydration, drink plenty of fluids, enough so that your urine is light yellow or clear like water. Choose water and other caffeine-free clear liquids until you feel better. If you have kidney, heart, or liver disease and have to limit fluids, talk with your doctor before you increase the amount of fluids you drink. · If you do not feel like eating or drinking, try taking small sips of water, sports drinks, or other rehydration drinks. · Get plenty of rest.  To prevent dehydration  · Add more fluids to your diet and daily routine, unless your doctor has told you not to. · During hot weather, drink more fluids. Drink even more fluids if you exercise a lot. Stay away from drinks with alcohol or caffeine. · Watch for the symptoms of dehydration. These include:  ? A dry, sticky mouth. ? Dark yellow urine, and not much of it. ? Dry and sunken eyes. ? Feeling very tired. · Learn what problems can lead to dehydration. These include:  ? Diarrhea, fever, and vomiting. ? Any illness with a fever, such as pneumonia or the flu. ?  Activities that cause heavy sweating, such as endurance races and heavy outdoor work in hot or humid weather. ? Alcohol or drug use or problems caused by quitting their use (withdrawal). ? Certain medicines, such as cold and allergy pills (antihistamines), diet pills (diuretics), and laxatives. ? Certain diseases, such as diabetes, cancer, and heart or kidney disease. When should you call for help? Call 911 anytime you think you may need emergency care. For example, call if:    · You passed out (lost consciousness). Call your doctor now or seek immediate medical care if:    · You are confused and cannot think clearly.     · You are dizzy or lightheaded, or you feel like you may faint.     · You have signs of needing more fluids. You have sunken eyes and a dry mouth, and you pass only a little dark urine.     · You cannot keep fluids down. Watch closely for changes in your health, and be sure to contact your doctor if:    · You are not making tears.     · Your skin is very dry and sags slowly back into place after you pinch it.     · Your mouth and eyes are very dry. Where can you learn more? Go to http://www.gray.com/  Enter Q814 in the search box to learn more about \"Dehydration: Care Instructions. \"  Current as of: June 26, 2019               Content Version: 12.6  © 6518-4841 [employer]. Care instructions adapted under license by [employer] (which disclaims liability or warranty for this information). If you have questions about a medical condition or this instruction, always ask your healthcare professional. [place] [address] any warranty or liability for your use of this information.

## 2021-03-29 LAB
ATRIAL RATE: 58 BPM
CALCULATED P AXIS, ECG09: 82 DEGREES
CALCULATED R AXIS, ECG10: 79 DEGREES
CALCULATED T AXIS, ECG11: 75 DEGREES
DIAGNOSIS, 93000: NORMAL
P-R INTERVAL, ECG05: 152 MS
Q-T INTERVAL, ECG07: 424 MS
QRS DURATION, ECG06: 80 MS
QTC CALCULATION (BEZET), ECG08: 416 MS
VENTRICULAR RATE, ECG03: 58 BPM

## 2021-04-01 ENCOUNTER — TELEPHONE (OUTPATIENT)
Dept: CASE MANAGEMENT | Age: 26
End: 2021-04-01

## 2021-04-01 NOTE — TELEPHONE ENCOUNTER
1055 CM called patient PCP left  to return call to patient with appointment date and time. 1100 CM called patient for the purpose of follow up to inpatient admission to check on environmental challenges/medications/appointment follow up/and questions/concerns. . The call was answered by  CM left [facility] to return call.     [address] [place] [address]  [phone]

## 2021-04-01 NOTE — PROGRESS NOTES
Late entry 3/28/21    SAMIR:  RUR: 9 %  1. Discharge home  2. PCP follow-up  3. Substance resources if patient agree    Reassessment for discharge. Patient admitted for CLAUDETTE with hyperkalemia which was resolved due to dehydration due to significant exercise. Patient works full time as a  at night and planning on retuning to work full-[employer] will need a work note if not discharge by Sunday. Patient states his mother Jessie Deutsch [phone] would be his decision maker if anything should happen to him. Staff are aware patient will need a work note on discharge. Patient will arrange his own transportation on discharge. Patient has regular [employer] and not active at this time patient to follow-up.     [address] [facility]  [phone]